# Patient Record
Sex: MALE | Race: WHITE | NOT HISPANIC OR LATINO | Employment: FULL TIME | URBAN - METROPOLITAN AREA
[De-identification: names, ages, dates, MRNs, and addresses within clinical notes are randomized per-mention and may not be internally consistent; named-entity substitution may affect disease eponyms.]

---

## 2022-06-14 DIAGNOSIS — B19.20 HEPATITIS C VIRUS INFECTION WITHOUT HEPATIC COMA, UNSPECIFIED CHRONICITY: Primary | ICD-10-CM

## 2022-08-26 ENCOUNTER — HOSPITAL ENCOUNTER (EMERGENCY)
Facility: HOSPITAL | Age: 22
Discharge: HOME | End: 2022-08-26
Attending: EMERGENCY MEDICINE
Payer: MEDICAID

## 2022-08-26 VITALS
WEIGHT: 230 LBS | OXYGEN SATURATION: 100 % | DIASTOLIC BLOOD PRESSURE: 77 MMHG | RESPIRATION RATE: 20 BRPM | HEIGHT: 69 IN | TEMPERATURE: 99 F | HEART RATE: 51 BPM | SYSTOLIC BLOOD PRESSURE: 123 MMHG | BODY MASS INDEX: 34.07 KG/M2

## 2022-08-26 DIAGNOSIS — K04.7 DENTAL ABSCESS: Primary | ICD-10-CM

## 2022-08-26 PROCEDURE — 99284 EMERGENCY DEPT VISIT MOD MDM: CPT | Mod: 25

## 2022-08-26 RX ORDER — AMOXICILLIN AND CLAVULANATE POTASSIUM 875; 125 MG/1; MG/1
1 TABLET, FILM COATED ORAL 2 TIMES DAILY
Qty: 20 TABLET | Refills: 0 | Status: SHIPPED | OUTPATIENT
Start: 2022-08-26 | End: 2022-09-05

## 2022-08-26 RX ORDER — IBUPROFEN 800 MG/1
800 TABLET ORAL 3 TIMES DAILY
Qty: 30 TABLET | Refills: 0 | Status: SHIPPED | OUTPATIENT
Start: 2022-08-26 | End: 2022-09-05

## 2022-08-27 NOTE — ED PROVIDER NOTES
Encounter Date: 2022       History     Chief Complaint   Patient presents with    Dental Pain     20-year-old male presents with right upper dental pain worsening over the last few days.  Patient reports to fracturing his back upper molar several weeks ago.  States that he continues to have pain and feels like it is infected.  Denies any drainage.  States that he has tried to see dentist but due to financial reasons was unable to have tooth pulled. Denies any trouble swallowing or opening his mouth.     The history is provided by the patient.     Review of patient's allergies indicates:  No Known Allergies  History reviewed. No pertinent past medical history.  History reviewed. No pertinent surgical history.  History reviewed. No pertinent family history.  Social History     Tobacco Use    Smoking status: Former Smoker     Quit date: 2022     Years since quittin.2    Smokeless tobacco: Never Used     Review of Systems   Constitutional: Negative for chills and fever.   HENT: Positive for dental problem. Negative for congestion, ear pain, facial swelling, sore throat, trouble swallowing and voice change.    Respiratory: Negative for cough and shortness of breath.    Cardiovascular: Negative.    Gastrointestinal: Negative.    Musculoskeletal: Negative.    Neurological: Negative for dizziness and headaches.   All other systems reviewed and are negative.      Physical Exam     Initial Vitals [22 2130]   BP Pulse Resp Temp SpO2   123/77 (!) 51 20 98.5 °F (36.9 °C) 100 %      MAP       --         Physical Exam    Vitals reviewed.  Constitutional: He appears well-developed. He is cooperative.   HENT:   Head: Normocephalic and atraumatic.   Right Ear: External ear normal.   Left Ear: External ear normal.   Mouth/Throat: Uvula is midline and oropharynx is clear and moist. No trismus in the jaw. No uvula swelling. No oropharyngeal exudate, posterior oropharyngeal edema, posterior oropharyngeal erythema  or tonsillar abscesses.       Eyes: Conjunctivae and EOM are normal. Pupils are equal, round, and reactive to light.   Neck: Neck supple.   Normal range of motion.  Cardiovascular: Normal rate, regular rhythm and normal heart sounds.   Pulmonary/Chest: Breath sounds normal. No respiratory distress. He has no wheezes. He has no rhonchi. He has no rales.   Abdominal: Abdomen is soft. Bowel sounds are normal. There is no abdominal tenderness.   Musculoskeletal:      Cervical back: Normal range of motion and neck supple.     Neurological: He is alert and oriented to person, place, and time.   Skin: Skin is warm and dry.   Psychiatric: He has a normal mood and affect.         ED Course   Procedures  Labs Reviewed - No data to display       Imaging Results    None          Medications - No data to display                       Clinical Impression:   Final diagnoses:  [K04.7] Dental abscess (Primary)          ED Disposition Condition    Discharge Stable        ED Prescriptions     Medication Sig Dispense Start Date End Date Auth. Provider    amoxicillin-clavulanate 875-125mg (AUGMENTIN) 875-125 mg per tablet Take 1 tablet by mouth 2 (two) times daily. for 10 days 20 tablet 8/26/2022 9/5/2022 VELMA Stanford    ibuprofen (ADVIL,MOTRIN) 800 MG tablet Take 1 tablet (800 mg total) by mouth 3 (three) times daily. for 10 days 30 tablet 8/26/2022 9/5/2022 VELMA Stanford        Follow-up Information     Follow up With Specialties Details Why Contact Info    Dentist   7-10 days, As needed            VELMA Stanford  08/26/22 2735

## 2022-08-27 NOTE — DISCHARGE INSTRUCTIONS
Gargle with warm salt water.  Use ibuprofen and Tylenol for pain and fever.  Take full course of antibiotics.  Follow-up with dentist.   CLINIC ADDRESS PHONE # INSURANCE   [facility] [address] [place] [address] [phone] Medicaid/Medicare   Dr. Oscar Thomson, DDS  [address] [place] [address] [phone] Medicaid   [facility] [address] [place] [address] [phone] Medicaid   [facility] [address] [place] [address] [phone] Medicare [facility]  [address] [place] [address] [phone] Medicaid/Medicare   [facility] [address] [place] [address] [phone] Medicaid   [facility]  [address]  [place] [address] [phone] Medicaid for ages 5 and under; or for lip/tongue tie    Dr. Steve Montilla, DDS [address] [place] [address] [phone] Medicaid-only for children ages 2 to 21   [facility] [address]  [place] [address] [phone] Medicaid   [facility] [address] [place] [address] [phone] Medicare [facility] [address] [place] [address] [phone] Medicaid   [facility] [address] [place] [address] [phone] Medicaid-Pediatric dentistry    [facility] [address] [place] [address] [phone] Medicaid/Medicare   [facility] [address] [place] [address] [phone] Medicaid/Medicare   [facility] [address]  [place] [address] [phone] Medicaid/Medicare   [facility] [address] [place] [address] [phone] Medicaid/Medicare   [place] [address] [address] [phone]  Medicaid/Medicare   [facility] [address] [place] [address] [phone] Medicaid-Pediatric and adult   [facility] [address]  [place], [place] [id number] [phone] Medicaid   [facility] [address] [place] [address] [phone] Medicare   Dr. Mariaelena Lerma,  [place] [address] [phone] Medicare for dentures only